# Patient Record
(demographics unavailable — no encounter records)

---

## 2024-10-10 NOTE — HISTORY OF PRESENT ILLNESS
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

## 2024-10-10 NOTE — HEALTH RISK ASSESSMENT
[Patient reported colonoscopy was normal] : Patient reported colonoscopy was normal [With Significant Other] : lives with significant other [ColonoscopyDate] : 1/19 [ColonoscopyComments] : will be seeing surgeron for repeat

## 2024-11-18 NOTE — HISTORY OF PRESENT ILLNESS
[Full time] : Work status: full time [de-identified] : doing Better with PT  [] : Post Surgical Visit: no [de-identified] :

## 2024-11-18 NOTE — REASON FOR VISIT
[FreeTextEntry2] : WC 1/7/22 This is a 59 year old [facility] worker with right shoulder pain after a fall with a loading injury. No history. He iced. It began to wake him at night. There is n/t down the arm. He sees Dr. Tucker, but he never had numbness before this accident. Reaching and lifting are affected. He is now off Gabapentin, Tramadol, and amytriptyline. The Medrol did not help. The MRI was done.  The AC injection in June 2022 helped slightly.  The numbness continues.  Dr. Watkins recommended CESIs with pain mgt.  He has been WFD.  He is doing OK, overall.  There is some pain with function.  There was an AC injection 1/15/24 though he does not remember if it helped. He is recovering from brain surgery 1/29/24. The right scapula injection 5/6/24 helped initially but did not last. He still has not been to PT yet because it was not approved by workers compBandar Pappas is here. The right MRI was done 8/27/24. He had a PET scan to rule out myeloma which was negative. Celebrex helped a bit. There is less pain. He has been in PT since early November.

## 2024-11-18 NOTE — ASSESSMENT
[FreeTextEntry1] : We discussed his course.  More PT is indicated.  Medication use discussed.  Celebrex 200mg is renewed.  He will follow up in 2 months.  Their questions were answered.   Patient seen by Domingo Segura M.D. Entered by Catherine Escobar acting as scribe.

## 2024-11-18 NOTE — DATA REVIEWED
[FreeTextEntry1] : RIGHT SHOULDER MRI [facility] 8/27/24: There are changes within the HH, neck and shaft which is abnormal with multiple, nonaggressive or aggressive options. There is increased tendinosis in the supraspinatus/subscapularis/biceps. Effusion is noted. There are minor AC changes. There are muscle changes that were not seen before. The biceps has a lot of fluid.   MRI 2/18/22: There are AC changes. A partial cuff tear is at least present. The motion artifact is notable. The muscle is decent.  EMG of the right upper extremity. and is consistent with radiculopathy. There is right C5-7 involvement.

## 2024-12-09 NOTE — PHYSICAL EXAM
[Right] : right shoulder [Sitting] : sitting [Moderate] : moderate [5 ___] : forward flexion 5[unfilled]/5 [5___] : external rotation 5[unfilled]/5 [] : no sensory deficits [FreeTextEntry9] : Adduction is diminished.  [de-identified] : There is a positive arc of pain. [TWNoteComboBox4] : passive forward flexion 150 degrees [TWNoteComboBox6] : internal rotation L1 [de-identified] : external rotation 60 degrees

## 2024-12-09 NOTE — HISTORY OF PRESENT ILLNESS
[Work related] : work related [Full time] : Work status: full time [de-identified] : doing Better with PT , need new RX [] : Post Surgical Visit: no [de-identified] :

## 2024-12-09 NOTE — ASSESSMENT
[FreeTextEntry1] : We discussed his course.  We discussed surgery.  This is continuing to be troublesome He would like to try one more AC injection.  He will follow up in 6 weeks.  Acupuncture script given.  Their questions were answered.   Patient seen by Dr. Domingo Segura, who determined the assessment and plan Shabnam BERMAN participated in the care of the patient, including the history and physical exam. ICatherine, am scribing for Dr. Domingo Segura in his presence for the chief complaint, physical exam, studies, assessment, and/or plan.   Procedure Name: Large Joint Injection / Aspiration: Depomedrol, Lidocaine and Guidance Ultrasound   Large Joint Injection was performed because of pain and inflammation. Depomedrol: An injection of Depomedrol 40 mg/cc, 1 cc. Lidocaine: An injection of Lidocaine 1 mg/cc, 4 cc.   Medication was injected in the right AC joint from the superior approach. Patient has tried OTC's including aspirin, Ibuprofen, Aleve etc or prescription NSAIDS, and/or exercises at home and/ or physical therapy without satisfactory response. The risks, benefits, and alternatives to steroid injection were explained in full to the patient. Risks outlined include but are not limited to infection, sepsis, bleeding, scarring, skin discoloration, temporary increase in pain, syncopal episode, failure to resolve symptoms, allergic reaction, symptom recurrence, and elevation of blood sugar in diabetics. Patient understood the risks. All questions were answered. After discussion, patient requested an injection. Oral informed consent was obtained.  Sterile preparation with betadine and aseptic technique was utilized for the procedure, including the preparation of the solutions used for the injection. Patient tolerated the procedure well.  Post Procedure Instructions: Patient was advised to call if redness, pain, or fever occur and apply ice for 15 min. out of every hour for the next 12-24 hours as tolerated. Patient was advised to rest the joint(s) for 3 days.  Advised to ice the injection site this evening. Ultrasound Guidance was used for the following reasons: for precise injection in area of tear. Visualization of the needle and placement of injection was performed without complication.

## 2024-12-09 NOTE — REASON FOR VISIT
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 No history. He iced. It began to wake him at night. There is n/t down the arm. He sees Dr. Tucker, but he never had numbness before this accident. Reaching and lifting are affected. He is now off Gabapentin, Tramadol, and amytriptyline. The Medrol did not help. The MRI was done.  The AC injection in June 2022 helped slightly.  The numbness continues.  Dr. Watkins recommended CESIs with pain mgt.  He has been WFD.  He is doing OK, overall.  There is some pain with function.  There was an AC injection 1/15/24 though he does not remember if it helped. He is recovering from brain surgery 1/29/24. The right scapula injection 5/6/24 helped initially but did not last. The right MRI was done 8/27/24. He had a PET scan to rule out myeloma which was negative. Celebrex helped a bit. There is less pain. He has been in PT since early November.

## 2025-01-13 NOTE — PHYSICAL EXAM
[Right] : right shoulder [Sitting] : sitting [Moderate] : moderate [5 ___] : forward flexion 5[unfilled]/5 [5___] : external rotation 5[unfilled]/5 [] : no sensory deficits [FreeTextEntry9] : Adduction is diminished.  [de-identified] : There is a positive arc of pain. [TWNoteComboBox4] : passive forward flexion 140 degrees [TWNoteComboBox6] : internal rotation L3 [de-identified] : external rotation 40 degrees

## 2025-01-13 NOTE — REASON FOR VISIT
[Spouse] : spouse [FreeTextEntry2] : WC 1/7/22 This is a 59 year old [facility] worker with right shoulder pain after a fall with a loading injury. No history. He iced. It began to wake him at night. There is n/t down the arm. He sees Dr. Tucker, but he never had numbness before this accident. Reaching and lifting are affected. He is now off Gabapentin, Tramadol, and amytriptyline. The Medrol did not help. The MRI was done.  The AC injection in June 2022 helped slightly.  The numbness continues.  Dr. Watkins recommended CESIs with pain mgt.  He has been WFD.  He is doing OK, overall.  There is some pain with function.  There was an AC injection 1/15/24 though he does not remember if it helped. He is recovering from brain surgery 1/29/24. The right scapula injection 5/6/24 helped initially but did not last. The right MRI was done 8/27/24. He had a PET scan to rule out myeloma which was negative. Celebrex helped a bit. There is less pain. He had been in PT in November.  The Dec 2024 AC injection did not help. He has had a full Heme/Onc workup.

## 2025-01-13 NOTE — HISTORY OF PRESENT ILLNESS
[6] : 6 [Full time] : Work status: full time [] : yes [de-identified] : Patient is here today to f/u on right shoulder pain. Pain has persisted since last visit. Interested in discussing surgery.  DOI: 01/07/2022 Currently working full time w/ no restrictions.   Notes grinding and popping sensations which are a new symptom    H/O glaucoma [de-identified] :

## 2025-01-13 NOTE — PHYSICAL EXAM
[Right] : right shoulder [Sitting] : sitting [Moderate] : moderate [5 ___] : forward flexion 5[unfilled]/5 [5___] : external rotation 5[unfilled]/5 [] : no sensory deficits [FreeTextEntry9] : Adduction is diminished.  [de-identified] : There is a positive arc of pain. [TWNoteComboBox4] : passive forward flexion 140 degrees [TWNoteComboBox6] : internal rotation L3 [de-identified] : external rotation 40 degrees

## 2025-01-13 NOTE — HISTORY OF PRESENT ILLNESS
[6] : 6 [Full time] : Work status: full time [] : yes [de-identified] : Patient is here today to f/u on right shoulder pain. Pain has persisted since last visit. Interested in discussing surgery.  DOI: 01/07/2022 Currently working full time w/ no restrictions.   Notes grinding and popping sensations which are a new symptom    H/O glaucoma [de-identified] :

## 2025-01-13 NOTE — REASON FOR VISIT
[Spouse] : spouse [FreeTextEntry2] : WC 1/7/22 This is a 59 year old [facility] worker with right shoulder pain after a fall with a loading injury. No history. He iced. It began to wake him at night. There is n/t down the arm. He sees Dr. Tucker, but he never had numbness before this accident. Reaching and lifting are affected. He is now off Gabapentin, Tramadol, and amytriptyline. The Medrol did not help. The MRI was done.  The AC injection in June 2022 helped slightly.  The numbness continues.  Dr. Watkins recommended CESIs with pain mgt.  He has been WFD.  He is doing OK, overall.  There is some pain with function.  There was an AC injection 1/15/24 though he does not remember if it helped. He is recovering from brain surgery 1/29/24. The right scapula injection 5/6/24 helped initially but did not last. The right MRI was done 8/27/24. He had a PET scan to rule out myeloma which was negative. Celebrex helped a bit. There is less pain. He had been in PT in November.  The Dec 2024 AC injection did not help. He has had a full Heme/Onc workup.

## 2025-01-13 NOTE — ASSESSMENT
[FreeTextEntry1] : We discussed the issues. His shoulder disease has progressed. He is best served with shoulder replacement. An eval with Dr. Carbajal is advised. He'll stop Celebrex for now. their questions were answered.  Patient seen by Dr. Domingo Segura, who determined the assessment and plan Shabnam BERMAN participated in the care of the patient, including the history and physical exam.

## 2025-01-15 NOTE — DATA REVIEWED
[MRI] : MRI [Right] : of the right [Shoulder] : shoulder [I independently reviewed and interpreted images and report] : I independently reviewed and interpreted images and report [FreeTextEntry1] : areas of nonspecific edema in proximal humerus, cuff tendinosis, advanced gh oa.

## 2025-01-15 NOTE — PHYSICAL EXAM
[Right] : right shoulder [4 ___] : forward flexion 4[unfilled]/5 [4___] : abduction 4[unfilled]/5 [] : motor and sensory intact distally [FreeTextEntry9] :  ER 30

## 2025-01-15 NOTE — HISTORY OF PRESENT ILLNESS
[de-identified] : WC DOI 1/7/2022 ([facility] worker)  1/15/2025: MEG DICKERSON, a59 year old LHD male, presents today for right shoulder. Patient reports he fell at work when using a  and when he fell backwards, he used his arms to brace his fall.  Pain continues to persist.  Reports to shooting pain down the arm and into the neck.    Patient has been seeing Dr Segura and treated with multiple injections and PT as noted below.  Patient also taking Celebrex prn for pain.    For the neck, patient is seeing dr mcmahan under this case.    MRI R shoulder (OC) 2024:    Pmhx - Patient had brain surgery for chiari malformation on 1/29/24

## 2025-01-15 NOTE — ASSESSMENT
[FreeTextEntry1] : R shoulder advanced DJD.  Xrays and advanced imaging reviewed. MRI R shoulder (OC) 2024: areas of patchy edema noted on mri, cuff tendinosis.  For the edema in the bone, patient followed up with heme/ onc after PET scan, who advised areas of edema are benign and likely related to arthritis.  This is as per patient and wife - there is no report in system.  Discussed op versus non op tx, including the r/b/a/c of both. Discussed rehab and recovery after TSA/RSA. Discussed timing, frequency and efficacy of cortisone injections. Discussed risks of repeated cortisone injections.  Discussed trial of visco supplementation. AC injection with Dr guerra on 1/15/24. R scapula injection with Dr guerra on 5/6/24.  AC injection with Dr guerra December 2024 no relief. He is working.  Request Orthovisc R shoulder.  Before proceeding with injections, advised we need formal clearance and letter from heme/ onc consult that they went to. RTO shots.

## 2025-01-15 NOTE — HISTORY OF PRESENT ILLNESS
[de-identified] : WC DOI 1/7/2022 ([facility] worker)  1/15/2025: MEG DICKERSON, a59 year old LHD male, presents today for right shoulder. Patient reports he fell at work when using a  and when he fell backwards, he used his arms to brace his fall.  Pain continues to persist.  Reports to shooting pain down the arm and into the neck.    Patient has been seeing Dr Segura and treated with multiple injections and PT as noted below.  Patient also taking Celebrex prn for pain.    For the neck, patient is seeing dr mcmahan under this case.    MRI R shoulder (OC) 2024:    Pmhx - Patient had brain surgery for chiari malformation on 1/29/24

## 2025-01-15 NOTE — ASSESSMENT
[FreeTextEntry1] : R shoulder advanced DJD.  Xrays and advanced imaging reviewed. MRI R shoulder (OC) 2024: areas of patchy edema noted on mri, cuff tendinosis.  For the edema in the bone, patient followed up with heme/ onc after PET scan, who advised areas of edema are benign and likely related to arthritis.  This is as per patient and wife - there is no report in system.  Discussed op versus non op tx, including the r/b/a/c of both. Discussed rehab and recovery after TSA/RSA. Discussed timing, frequency and efficacy of cortisone injections. Discussed risks of repeated cortisone injections.  Discussed trial of visco supplementation. AC injection with Dr guerra on 1/15/24. R scapula injection with Dr guerra on 5/6/24.  AC injection with Dr guerra December 2024 no relief. He is working.  Request Orthovisc R shoulder.  Before proceeding with injections, advised we need formal clearance and letter from heme/ onc consult that they went to. RTO shots.     Normal

## 2025-02-19 NOTE — HISTORY OF PRESENT ILLNESS
[de-identified] : WC DOI 1/7/2022 ([employer] worker)  02/19/2025: Patient is here for follow up of right shoulder.  pain continues to persist.  orthovisc   1/15/2025: MEG DICKERSON, a59 year old LHD male, presents today for right shoulder. Patient reports he fell at work when using a  and when he fell backwards, he used his arms to brace his fall.  Pain continues to persist.  Reports to shooting pain down the arm and into the neck.    Patient has been seeing Dr Segura and treated with multiple injections and PT as noted below.  Patient also taking Celebrex prn for pain.    For the neck, patient is seeing dr mcmahan under this case.   MRI R shoulder (OC) 2024: areas of patchy edema noted on mri, cuff tendinosis.   Pmhx - high cholesterol, gerd, htn Patient had brain surgery for chiari malformation on 1/29/24

## 2025-02-19 NOTE — PHYSICAL EXAM
[Right] : right shoulder [4 ___] : forward flexion 4[unfilled]/5 [4___] : abduction 4[unfilled]/5 [] : no erythema [FreeTextEntry9] :  ER 30

## 2025-02-19 NOTE — ASSESSMENT
[FreeTextEntry1] : R shoulder advanced DJD.  Xrays and advanced imaging reviewed. MRI R shoulder (OC) 2024: areas of patchy edema noted on mri, cuff tendinosis.  For the edema in the bone, patient followed up with heme/ onc after PET scan, who advised areas of edema are benign and likely related to arthritis.  This is as per patient and wife - there is no report in system.  Discussed op versus non op tx, including the r/b/a/c of both. Discussed rehab and recovery after TSA/RSA. Discussed timing, frequency and efficacy of cortisone injections. Discussed risks of repeated cortisone injections.  Discussed trial of visco supplementation. Diclofenac 75mg BID prn pain and topical sent as well.  AC injection with Dr guerra on 1/15/24. R scapula injection with Dr guerra on 5/6/24.  AC injection with Dr guerra December 2024 no relief. Request Orthovisc R shoulder again.   Before proceeding with injections, advised we need formal clearance and letter from heme/ onc consult that they went to.  Patient is to bring in this paperwork before the injections.  He is working.  RTO shots.

## 2025-04-23 NOTE — REVIEW OF SYSTEMS
[Blurry Vision] : blurred vision [Joint Pain] : joint pain [Dizziness] : dizziness [Numbness (Hypoesthesia)] : numbness [Under Stress] : under stress [Negative] : Heme/Lymph

## 2025-04-24 NOTE — CARDIOLOGY SUMMARY
[de-identified] : 9/19/24: SR, nonspecific t wave changes 3/4/24: SR, poor R wave progression 12/19/22: 12/19/22: SR, iRBBB, non spec t wave 9/16/2020:SR, ant infarct, non spec twave [de-identified] : 9/24/2020: Bakari protocol 13:01, no ischemia 11/22/21: Bakari protocol 12:00, no ischemia [de-identified] : 9/24/2020: nl EF, E/A reversal 11/22/21: nl EF, Grade I DD [de-identified] : 3/22/24: CORONARY: Right coronary artery dominance. No evidence for anomalous coronary artery origin.  LEFT MAIN: Calcified plaque results in less than 25% stenosis.  LEFT ANTERIOR DESCENDING: No significant stenosis. Scattered minimal stenosis secondary to noncalcified plaque. Distal LAD wraps around the apex. Early branching first diagonal artery is without significant stenosis. Remaining diagonal arteries are too small to characterize.  RAMUS INTERMEDIUS: Tiny caliber vessel, too small to characterize.  LEFT CIRCUMFLEX: No significant stenosis. Scattered minimal stenosis secondary to noncalcified plaque. Tiny first obtuse marginal artery is too small to characterize. Second obtuse marginal artery is without significant stenosis. Terminal circumflex artery beyond the second OM is too small to characterize.  RIGHT CORONARY ARTERY:  No significant stenosis. Scattered minimal stenosis secondary to noncalcified plaque. PDA and posterolateral branches are identified.  Irregular filling defect of the distal right main pulmonary artery extending to the interlobar artery, age indeterminate. Portion of the clot appears attached to the pulmonary artery wall, while majority of the finding is free floating. Dedicated CT pulmonary angiogram is recommended for further evaluation. 3/29/24: IMPRESSION: Compared to the previous examination, the pulmonary embolus in the distal right main pulmonary artery has decreased in size.

## 2025-04-24 NOTE — PHYSICAL EXAM
[Well Developed] : well developed [Well Nourished] : well nourished [No Acute Distress] : no acute distress [Normal Conjunctiva] : normal conjunctiva [Normal Venous Pressure] : normal venous pressure [No Carotid Bruit] : no carotid bruit [Normal S1, S2] : normal S1, S2 [No Rub] : no rub [No Gallop] : no gallop [Murmur] : murmur [Clear Lung Fields] : clear lung fields [Good Air Entry] : good air entry [No Respiratory Distress] : no respiratory distress  [Soft] : abdomen soft [Non Tender] : non-tender [Abnormal Gait] : abnormal gait [No Edema] : no edema [No Cyanosis] : no cyanosis [No Clubbing] : no clubbing [No Varicosities] : no varicosities [No Rash] : no rash [No Skin Lesions] : no skin lesions [Moves all extremities] : moves all extremities [No Focal Deficits] : no focal deficits [Normal Speech] : normal speech [Alert and Oriented] : alert and oriented [Normal memory] : normal memory [de-identified] : very soft systolic murmur LLSB

## 2025-04-24 NOTE — PHYSICAL EXAM
[Well Developed] : well developed [Well Nourished] : well nourished [No Acute Distress] : no acute distress [Normal Conjunctiva] : normal conjunctiva [Normal Venous Pressure] : normal venous pressure [No Carotid Bruit] : no carotid bruit [Normal S1, S2] : normal S1, S2 [No Rub] : no rub [No Gallop] : no gallop [Murmur] : murmur [Clear Lung Fields] : clear lung fields [Good Air Entry] : good air entry [No Respiratory Distress] : no respiratory distress  [Soft] : abdomen soft [Non Tender] : non-tender [Abnormal Gait] : abnormal gait [No Edema] : no edema [No Cyanosis] : no cyanosis [No Clubbing] : no clubbing [No Varicosities] : no varicosities [No Rash] : no rash [No Skin Lesions] : no skin lesions [Moves all extremities] : moves all extremities [No Focal Deficits] : no focal deficits [Normal Speech] : normal speech [Alert and Oriented] : alert and oriented [Normal memory] : normal memory [de-identified] : very soft systolic murmur LLSB

## 2025-04-24 NOTE — HISTORY OF PRESENT ILLNESS
[FreeTextEntry1] : 60M HTN, HLD, preDM, hypothyroid, JAMES, provoked PE, MM, hx of situational hypertension who presents for follow-up  s/p Suboccipital decompression, C1 laminectomy with autologous duraplasty, 4th vent stent postoperatively with pinching chest sensation, exertional found to have a PE on CTA heart, confirmed with CT PE protocol. pt noted sx had improved by then. was started on AC  s/p 6 months AC  walks a lot for work no active cardiac sx  9/14/22:   4/20/25: Chol 178//HDL 46/

## 2025-04-24 NOTE — PHYSICAL EXAM
[Alert] : alert [Normal Sclera/Conjunctiva] : normal sclera/conjunctiva [Normal Outer Ear/Nose] : the ears and nose were normal in appearance [No Neck Mass] : no neck mass was observed [No Respiratory Distress] : no respiratory distress [Normal PMI] : the apical impulse was normal [Normal Bowel Sounds] : normal bowel sounds [No CVA Tenderness] : no ~M costovertebral angle tenderness [No Stigmata of Cushings Syndrome] : no stigmata of Cushings Syndrome [Cranial Nerves Intact] : cranial nerves 2-12 were intact [Oriented x3] : oriented to person, place, and time

## 2025-04-24 NOTE — CARDIOLOGY SUMMARY
[de-identified] : 9/19/24: SR, nonspecific t wave changes 3/4/24: SR, poor R wave progression 12/19/22: 12/19/22: SR, iRBBB, non spec t wave 9/16/2020:SR, ant infarct, non spec twave [de-identified] : 9/24/2020: Bakari protocol 13:01, no ischemia 11/22/21: Bakari protocol 12:00, no ischemia [de-identified] : 9/24/2020: nl EF, E/A reversal 11/22/21: nl EF, Grade I DD [de-identified] : 3/22/24: CORONARY: Right coronary artery dominance. No evidence for anomalous coronary artery origin.  LEFT MAIN: Calcified plaque results in less than 25% stenosis.  LEFT ANTERIOR DESCENDING: No significant stenosis. Scattered minimal stenosis secondary to noncalcified plaque. Distal LAD wraps around the apex. Early branching first diagonal artery is without significant stenosis. Remaining diagonal arteries are too small to characterize.  RAMUS INTERMEDIUS: Tiny caliber vessel, too small to characterize.  LEFT CIRCUMFLEX: No significant stenosis. Scattered minimal stenosis secondary to noncalcified plaque. Tiny first obtuse marginal artery is too small to characterize. Second obtuse marginal artery is without significant stenosis. Terminal circumflex artery beyond the second OM is too small to characterize.  RIGHT CORONARY ARTERY:  No significant stenosis. Scattered minimal stenosis secondary to noncalcified plaque. PDA and posterolateral branches are identified.  Irregular filling defect of the distal right main pulmonary artery extending to the interlobar artery, age indeterminate. Portion of the clot appears attached to the pulmonary artery wall, while majority of the finding is free floating. Dedicated CT pulmonary angiogram is recommended for further evaluation. 3/29/24: IMPRESSION: Compared to the previous examination, the pulmonary embolus in the distal right main pulmonary artery has decreased in size.

## 2025-04-24 NOTE — DISCUSSION/SUMMARY
[FreeTextEntry1] : HTN with component of situational elevation and currently well controlled PE s/p 6 months of Eliquis  -CCTA negative for significant CAD -cont carvedilol 6.25mg BID -cont rosuvastatin for LDL goal < 100 -heart healthy lifestyle reviewed

## 2025-04-24 NOTE — HISTORY OF PRESENT ILLNESS
[FreeTextEntry1] : This is a 59 yo male who presents for follow up of hypothyroidism and thyroid nodule This is  my first visit with this patient. He was last seen by his Endocrinologist in March 2023   He was noted to have thyroid nodule in 2008. he is taking levothyroxine 112mcg daily for hypothyroidism, which was diagnosed 15 years ago. At last endocrine visit in Sept 2022 (former patient of Dr Cabrera) he wad advised to repeat thyroid sonogram in 3-5 years. Last sonogram was from 2021   US Thyroid done on 4-8-25 at [facility]  Right Thyroid lobe - 4.5x1.1x1.8 cm and Left Thyroid lobe is 5.0 x1.1x 1.4 cm Isthmus - 0.2 cm  A subcentimeter cyst is noted in the upper pole of the Left Lobe.  A 0.5 x0.3x0.6 cm solid nodule is noted in the isthmus which us stable in size.

## 2025-05-07 NOTE — PHYSICAL EXAM
[Right] : right shoulder [4 ___] : forward flexion 4[unfilled]/5 [4___] : abduction 4[unfilled]/5 [] : no erythema [FreeTextEntry9] : FF 100A 110P ER 30

## 2025-05-07 NOTE — HISTORY OF PRESENT ILLNESS
[de-identified] : WC DOI 1/7/2022 ([employer] worker)  05/07/25 - Patient is here for follow up of right shoulder pain. States that pain has gotten worse since last visit. Denies injury.  There has been no approval for orthovisc yet  02/19/2025: Patient is here for follow up of right shoulder.  pain continues to persist.  orthovisc   1/15/2025: MEG DICKERSON, a59 year old LHD male, presents today for right shoulder. Patient reports he fell at work when using a  and when he fell backwards, he used his arms to brace his fall.  Pain continues to persist.  Reports to shooting pain down the arm and into the neck.    Patient has been seeing Dr Segura and treated with multiple injections and PT as noted below.  Patient also taking Celebrex prn for pain.    For the neck, patient is seeing dr mcmahan under this case.   MRI R shoulder (OC) 2024: areas of patchy edema noted on mri, cuff tendinosis.   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

## 2025-05-07 NOTE — ASSESSMENT
[FreeTextEntry1] : R shoulder advanced DJD.   MRI R shoulder (OC) 2024: areas of patchy edema noted on mri, cuff tendinosis.  For the edema in the bone, patient followed up with heme/ onc after PET scan, who advised areas of edema are benign and likely related to arthritis.  They have approval from oncologist to proceed with injections.   Diclofenac 75mg BID prn pain and topical sent as well.  AC injection with Dr guerra on 1/15/24. R scapula injection with Dr guerra on 5/6/24.  AC injection with Dr guerra December 2024 no relief. Request Orthovisc R shoulder again.   R GH CSI today on 5/7/25.  Tolerated well.  He is working.  RTO shots.    Procedure Note: Large Joint Injection was performed because of pain and inflammation, failure of conservative treatment.   Medications: Depo-Medrol: 1 cc, 80 mg. Lidocaine: 2 cc, 1%.  Marcaine: 2 cc, .25%.   Medication was injected in the right glenohumeral joint. Patient has tried OTC's including aspirin, Ibuprofen, Aleve etc or prescription NSAIDs, and/or exercises at home and/ or physical therapy without satisfactory response. The risks, benefits, and alternatives to cortisone injection were explained in full to the patient. Risks outlined include but are not limited to infection, sepsis, bleeding, scarring, skin discoloration, temporary increase in pain, syncopal episode, failure to resolve symptoms, allergic reaction, symptom recurrence, and elevation of blood sugar in diabetics. Patient understood the risks. All questions were answered. After discussion of options, patient requested an injection. Oral informed consent was obtained and sterile prep of the injection site was performed using alcohol. Sterile technique was utilized for the procedure including the preparation of the solutions used for the injection. Ethyl chloride spray was used topically.  Sterile technique used. Patient tolerated procedure well. Post Procedure Instructions: Patient was advised to call if redness, pain, or fever occur and apply ice for 15 min. out of every hour for the next 12-24 hours as tolerated. patient was advised to rest the joint(s) for 2 days.  Ultrasound Guidance was used for the following reasons: for Glenohumeral injection.  Ultrasound guided injection was performed of the shoulder, visualization of the needle and placement of injection was performed without complication.

## 2025-06-18 NOTE — ASSESSMENT
[FreeTextEntry1] : R shoulder advanced DJD.   MRI R shoulder (OC) 2024: areas of patchy edema noted on mri, cuff tendinosis.  For the edema in the bone, patient followed up with heme/ onc after PET scan, who advised areas of edema are benign and likely related to arthritis.  They have approval from oncologist to proceed with injections.   AC injection with Dr guerra on 1/15/24. R scapula injection with Dr guerra on 5/6/24.  AC injection with Dr guerra December 2024 no relief. R GH CSI today on 5/7/25 with relief.  Recently dx with diverticulitis - he will stop Diclofenac.   Advised on Tylenol prn.   Will put in request Orthovisc R shoulder again today.  He is working.  RTO shots.

## 2025-06-18 NOTE — HISTORY OF PRESENT ILLNESS
[de-identified] : WC DOI 1/7/2022 ([employer] [place] worker)  6/18/25:  Follow up right shoulder.  Pain continues.  Depo was last week.  He is compliant with HEP.   05/07/25 - Patient is here for follow up of right shoulder pain. States that pain has gotten worse since last visit. Denies injury.  There has been no approval for orthovisc yet  02/19/2025: Patient is here for follow up of right shoulder.  pain continues to persist.  orthovisc   1/15/2025: MEG DICKERSON, a59 year old LHD male, presents today for right shoulder. Patient reports he fell at work when using a  and when he fell backwards, he used his arms to brace his fall.  Pain continues to persist.  Reports to shooting pain down the arm and into the neck.    Patient has been seeing Dr Segura and treated with multiple injections and PT as noted below.  Patient also taking Celebrex prn for pain.    For the neck, patient is seeing dr mcmahan under this case.   MRI R shoulder (OC) 2024: areas of patchy edema noted on mri, cuff tendinosis.   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